# Patient Record
Sex: FEMALE | Race: WHITE | NOT HISPANIC OR LATINO | Employment: UNEMPLOYED | URBAN - METROPOLITAN AREA
[De-identification: names, ages, dates, MRNs, and addresses within clinical notes are randomized per-mention and may not be internally consistent; named-entity substitution may affect disease eponyms.]

---

## 2023-03-07 RX ORDER — BACLOFEN 10 MG/1
20 TABLET ORAL AT BEDTIME
COMMUNITY
End: 2023-03-07

## 2023-03-07 RX ORDER — ACETAMINOPHEN 500 MG
500-1000 TABLET ORAL EVERY 6 HOURS PRN
COMMUNITY

## 2023-03-07 RX ORDER — IBUPROFEN 200 MG
200-800 TABLET ORAL EVERY 6 HOURS PRN
COMMUNITY

## 2023-03-07 RX ORDER — FLUTICASONE PROPIONATE 50 MCG
2 SPRAY, SUSPENSION (ML) NASAL PRN
COMMUNITY

## 2023-03-07 RX ORDER — CYCLOBENZAPRINE HCL 10 MG
10 TABLET ORAL
COMMUNITY

## 2023-03-07 RX ORDER — PREGABALIN 300 MG/1
1 CAPSULE ORAL 2 TIMES DAILY
COMMUNITY

## 2023-03-07 RX ORDER — ONDANSETRON 4 MG/1
4 TABLET, FILM COATED ORAL 3 TIMES DAILY PRN
COMMUNITY

## 2023-03-07 RX ORDER — BACLOFEN 10 MG/1
10 TABLET ORAL EVERY MORNING
COMMUNITY
End: 2023-03-07

## 2023-03-07 NOTE — PROGRESS NOTES
PTA medications updated by Medication Scribe prior to surgery via phone call with patient (last doses completed by Nurse)     Medication history sources: Patient and H&P  In the past week, patient estimated taking medication this percent of the time: Greater than 90%  Adherence assessment: N/A Not Observed    Significant changes made to the medication list:  Patient reports no longer taking the following meds (med scribe removed from PTA med list): Baclofen      Additional medication history information:   None    Medication reconciliation completed by provider prior to medication history? No    Time spent in this activity: 40 minutes    The information provided in this note is only as accurate as the sources available at the time of update(s)      Prior to Admission medications    Medication Sig Last Dose Taking? Auth Provider Long Term End Date   acetaminophen (TYLENOL) 500 MG tablet Take 500-1,000 mg by mouth every 6 hours as needed for mild pain Unknown at PRN Yes Reported, Patient     cyclobenzaprine (FLEXERIL) 10 MG tablet Take 10 mg by mouth nightly as needed Unknown at HS Yes Reported, Patient     diclofenac (VOLTAREN) 1 % topical gel Apply 0.5 g topically as needed Past Week at PRN Yes Reported, Patient     fluticasone (FLONASE) 50 MCG/ACT nasal spray Spray 2 sprays into both nostrils as needed for rhinitis or allergies Unknown at PRN Yes Reported, Patient     ibuprofen (ADVIL/MOTRIN) 200 MG tablet Take 200-800 mg by mouth every 6 hours as needed for moderate pain (4-6) 2/27/2023 at PRN Yes Reported, Patient     Multiple Vitamin (MULTIVITAMIN ADULT PO) Take 1 tablet by mouth daily 3/7/2023 at AM Yes Reported, Patient     NONFORMULARY Take 150 mg by mouth daily Methadone Liquid  Clinic:[facility]  Phone # [phone] 3/7/2023 at AM Yes Reported, Patient     ondansetron (ZOFRAN) 4 MG tablet Take 4 mg by mouth 3 times daily as needed for nausea Unknown at PRN Yes Reported, Patient      pregabalin (LYRICA) 300 MG capsule Take 1 capsule by mouth 2 times daily  at AM Yes Reported, Patient Yes

## 2023-03-08 ENCOUNTER — ANESTHESIA EVENT (OUTPATIENT)
Dept: SURGERY | Facility: CLINIC | Age: 43
End: 2023-03-08
Payer: COMMERCIAL

## 2023-03-08 ASSESSMENT — LIFESTYLE VARIABLES: TOBACCO_USE: 1

## 2023-03-08 NOTE — ANESTHESIA PREPROCEDURE EVALUATION
Anesthesia Pre-Procedure Evaluation    Patient: Janki Schmid   MRN: [id number] : 1980        Procedure : Procedure(s):  extraction of teeth 2, 3, 14, 18, 29          Past Medical History:   Diagnosis Date     CHESTER (generalized anxiety disorder)      Major depressive disorder, recurrent episode, moderate (H)      Other chronic pain     methadone maintenance     Primary insomnia      Temporomandibular joint disorder       Past Surgical History:   Procedure Laterality Date     GYN SURGERY      COLPOSCOPY     HYSTERECTOMY        No Known Allergies   Social History     Tobacco Use     Smoking status: Every Day     Years: 15.00     Types: Cigarettes     Smokeless tobacco: Never   Substance Use Topics     Alcohol use: Never      Wt Readings from Last 1 Encounters:   No data found for Wt        Prior to Admission medications    Medication Sig Start Date End Date Taking? Authorizing Provider   acetaminophen (TYLENOL) 500 MG tablet Take 500-1,000 mg by mouth every 6 hours as needed for mild pain   Yes Reported, Patient   cyclobenzaprine (FLEXERIL) 10 MG tablet Take 10 mg by mouth nightly as needed   Yes Reported, Patient   diclofenac (VOLTAREN) 1 % topical gel Apply 0.5 g topically as needed   Yes Reported, Patient   fluticasone (FLONASE) 50 MCG/ACT nasal spray Spray 2 sprays into both nostrils as needed for rhinitis or allergies   Yes Reported, Patient   ibuprofen (ADVIL/MOTRIN) 200 MG tablet Take 200-800 mg by mouth every 6 hours as needed for moderate pain (4-6)   Yes Reported, Patient   Multiple Vitamin (MULTIVITAMIN ADULT PO) Take 1 tablet by mouth daily   Yes Reported, Patient   NONFORMULARY Take 150 mg by mouth daily Methadone Liquid  Clinic:[facility]  Phone # [phone]   Yes Reported, Patient   ondansetron (ZOFRAN) 4 MG tablet Take 4 mg by mouth 3 times daily as needed for nausea   Yes Reported, Patient   pregabalin (LYRICA) 300 MG capsule Take 1 capsule by mouth 2 times daily    Yes Reported, Patient     RECENT LABS:   ECG:   ECHO:   CXR:      Anesthesia Evaluation   Pt has had prior anesthetic. Type: General.    No history of anesthetic complications       ROS/MED HX  ENT/Pulmonary: Comment: TMJ    (+) allergic rhinitis, tobacco use, Past use,  (-) asthma and sleep apnea   Neurologic:    (-) no seizures, no CVA and migraines   Cardiovascular:    (-) hypertension, CAD, HURTADO, arrhythmias, valvular problems/murmurs and dyslipidemia   METS/Exercise Tolerance: >4 METS    Hematologic:    (-) history of blood clots and anemia   Musculoskeletal:    (-) arthritis   GI/Hepatic:    (-) GERD and liver disease   Renal/Genitourinary:    (-) renal disease and nephrolithiasis   Endo:     (+) Obesity,  (-) Type I DM, Type II DM and thyroid disease   Psychiatric/Substance Use:     (+) psychiatric history anxiety and depression H/O chronic opiod use .     Infectious Disease:    (-) Recent Fever   Malignancy:       Other:      (+) , H/O Chronic Pain,        Physical Exam    Airway        Mallampati: II   TM distance: > 3 FB   Neck ROM: full   Mouth opening: > 3 cm    Respiratory Devices and Support         Dental       (+) Completely normal teeth      Cardiovascular   cardiovascular exam normal       Rhythm and rate: normal     Pulmonary   pulmonary exam normal        breath sounds clear to auscultation           OUTSIDE LABS:  CBC: No results found for: WBC, HGB, HCT, PLT  BMP: No results found for: NA, POTASSIUM, CHLORIDE, CO2, BUN, CR, GLC  COAGS: No results found for: PTT, INR, FIBR  POC: No results found for: BGM, HCG, HCGS  HEPATIC: No results found for: ALBUMIN, PROTTOTAL, ALT, AST, GGT, ALKPHOS, BILITOTAL, BILIDIRECT, CARMELA  OTHER: No results found for: PH, LACT, A1C, HAMZAH, PHOS, MAG, LIPASE, AMYLASE, TSH, T4, T3, CRP, SED    Anesthesia Plan    ASA Status:  2   NPO Status:  NPO Appropriate    Anesthesia Type: General.     - Airway: ETT   Induction: Propofol.   Maintenance: Balanced.         Consents    Anesthesia Plan(s) and associated risks, benefits, and realistic alternatives discussed. Questions answered and patient/representative(s) expressed understanding.    - Discussed:     - Discussed with:  Patient         Postoperative Care    Pain management: Multi-modal analgesia.   PONV prophylaxis: Ondansetron (or other 5HT-3), Dexamethasone or Solumedrol     Comments:                Mi Cuevas MD

## 2023-03-09 ENCOUNTER — HOSPITAL ENCOUNTER (OUTPATIENT)
Facility: CLINIC | Age: 43
Discharge: HOME | End: 2023-03-09
Attending: DENTIST | Admitting: DENTIST
Payer: COMMERCIAL

## 2023-03-09 ENCOUNTER — ANESTHESIA (OUTPATIENT)
Dept: SURGERY | Facility: CLINIC | Age: 43
End: 2023-03-09
Payer: COMMERCIAL

## 2023-03-09 VITALS
BODY MASS INDEX: 42.75 KG/M2 | DIASTOLIC BLOOD PRESSURE: 60 MMHG | RESPIRATION RATE: 12 BRPM | SYSTOLIC BLOOD PRESSURE: 141 MMHG | WEIGHT: 266 LBS | TEMPERATURE: 97.1 F | OXYGEN SATURATION: 92 % | HEART RATE: 65 BPM | HEIGHT: 66 IN

## 2023-03-09 DIAGNOSIS — K02.9 DENTAL CARIES: Primary | ICD-10-CM

## 2023-03-09 PROCEDURE — 360N000075 HC SURGERY LEVEL 2, PER MIN: Performed by: DENTIST

## 2023-03-09 PROCEDURE — 250N000011 HC RX IP 250 OP 636: Performed by: DENTIST

## 2023-03-09 PROCEDURE — 250N000009 HC RX 250: Performed by: DENTIST

## 2023-03-09 PROCEDURE — 250N000013 HC RX MED GY IP 250 OP 250 PS 637: Performed by: DENTIST

## 2023-03-09 PROCEDURE — 258N000003 HC RX IP 258 OP 636: Performed by: ANESTHESIOLOGY

## 2023-03-09 PROCEDURE — 250N000009 HC RX 250: Performed by: ANESTHESIOLOGY

## 2023-03-09 PROCEDURE — 370N000017 HC ANESTHESIA TECHNICAL FEE, PER MIN: Performed by: DENTIST

## 2023-03-09 PROCEDURE — 250N000011 HC RX IP 250 OP 636: Performed by: ANESTHESIOLOGY

## 2023-03-09 PROCEDURE — 710N000012 HC RECOVERY PHASE 2, PER MINUTE: Performed by: DENTIST

## 2023-03-09 PROCEDURE — 250N000025 HC SEVOFLURANE, PER MIN: Performed by: DENTIST

## 2023-03-09 PROCEDURE — 999N000141 HC STATISTIC PRE-PROCEDURE NURSING ASSESSMENT: Performed by: DENTIST

## 2023-03-09 PROCEDURE — 272N000001 HC OR GENERAL SUPPLY STERILE: Performed by: DENTIST

## 2023-03-09 PROCEDURE — 710N000009 HC RECOVERY PHASE 1, LEVEL 1, PER MIN: Performed by: DENTIST

## 2023-03-09 RX ORDER — CEFAZOLIN SODIUM/WATER 2 G/20 ML
2 SYRINGE (ML) INTRAVENOUS SEE ADMIN INSTRUCTIONS
Status: DISCONTINUED | OUTPATIENT
Start: 2023-03-09 | End: 2023-03-09 | Stop reason: HOSPADM

## 2023-03-09 RX ORDER — SODIUM CHLORIDE, SODIUM LACTATE, POTASSIUM CHLORIDE, CALCIUM CHLORIDE 600; 310; 30; 20 MG/100ML; MG/100ML; MG/100ML; MG/100ML
INJECTION, SOLUTION INTRAVENOUS CONTINUOUS
Status: DISCONTINUED | OUTPATIENT
Start: 2023-03-09 | End: 2023-03-09 | Stop reason: HOSPADM

## 2023-03-09 RX ORDER — PROPOFOL 10 MG/ML
INJECTION, EMULSION INTRAVENOUS PRN
Status: DISCONTINUED | OUTPATIENT
Start: 2023-03-09 | End: 2023-03-09

## 2023-03-09 RX ORDER — HYDROMORPHONE HCL IN WATER/PF 6 MG/30 ML
0.4 PATIENT CONTROLLED ANALGESIA SYRINGE INTRAVENOUS EVERY 5 MIN PRN
Status: DISCONTINUED | OUTPATIENT
Start: 2023-03-09 | End: 2023-03-09 | Stop reason: HOSPADM

## 2023-03-09 RX ORDER — HYDROCODONE BITARTRATE AND ACETAMINOPHEN 5; 325 MG/1; MG/1
1 TABLET ORAL EVERY 6 HOURS PRN
Qty: 10 TABLET | Refills: 0 | Status: SHIPPED | OUTPATIENT
Start: 2023-03-09 | End: 2023-03-12

## 2023-03-09 RX ORDER — ONDANSETRON 2 MG/ML
4 INJECTION INTRAMUSCULAR; INTRAVENOUS EVERY 30 MIN PRN
Status: DISCONTINUED | OUTPATIENT
Start: 2023-03-09 | End: 2023-03-09 | Stop reason: HOSPADM

## 2023-03-09 RX ORDER — BUPIVACAINE HYDROCHLORIDE AND EPINEPHRINE 2.5; 5 MG/ML; UG/ML
INJECTION, SOLUTION INFILTRATION; PERINEURAL PRN
Status: DISCONTINUED | OUTPATIENT
Start: 2023-03-09 | End: 2023-03-09 | Stop reason: HOSPADM

## 2023-03-09 RX ORDER — LIDOCAINE HYDROCHLORIDE 20 MG/ML
INJECTION, SOLUTION INFILTRATION; PERINEURAL PRN
Status: DISCONTINUED | OUTPATIENT
Start: 2023-03-09 | End: 2023-03-09

## 2023-03-09 RX ORDER — AMOXICILLIN 500 MG/1
500 CAPSULE ORAL 3 TIMES DAILY
Qty: 21 CAPSULE | Refills: 0 | Status: SHIPPED | OUTPATIENT
Start: 2023-03-09 | End: 2023-03-16

## 2023-03-09 RX ORDER — ONDANSETRON 4 MG/1
4 TABLET, ORALLY DISINTEGRATING ORAL EVERY 30 MIN PRN
Status: DISCONTINUED | OUTPATIENT
Start: 2023-03-09 | End: 2023-03-09 | Stop reason: HOSPADM

## 2023-03-09 RX ORDER — HYDROMORPHONE HCL IN WATER/PF 6 MG/30 ML
0.2 PATIENT CONTROLLED ANALGESIA SYRINGE INTRAVENOUS EVERY 5 MIN PRN
Status: DISCONTINUED | OUTPATIENT
Start: 2023-03-09 | End: 2023-03-09 | Stop reason: HOSPADM

## 2023-03-09 RX ORDER — CEFAZOLIN SODIUM/WATER 2 G/20 ML
2 SYRINGE (ML) INTRAVENOUS
Status: COMPLETED | OUTPATIENT
Start: 2023-03-09 | End: 2023-03-09

## 2023-03-09 RX ORDER — SODIUM CHLORIDE, SODIUM LACTATE, POTASSIUM CHLORIDE, CALCIUM CHLORIDE 600; 310; 30; 20 MG/100ML; MG/100ML; MG/100ML; MG/100ML
INJECTION, SOLUTION INTRAVENOUS CONTINUOUS PRN
Status: DISCONTINUED | OUTPATIENT
Start: 2023-03-09 | End: 2023-03-09

## 2023-03-09 RX ORDER — FENTANYL CITRATE 0.05 MG/ML
50 INJECTION, SOLUTION INTRAMUSCULAR; INTRAVENOUS EVERY 5 MIN PRN
Status: DISCONTINUED | OUTPATIENT
Start: 2023-03-09 | End: 2023-03-09 | Stop reason: HOSPADM

## 2023-03-09 RX ORDER — FENTANYL CITRATE 0.05 MG/ML
25 INJECTION, SOLUTION INTRAMUSCULAR; INTRAVENOUS EVERY 5 MIN PRN
Status: DISCONTINUED | OUTPATIENT
Start: 2023-03-09 | End: 2023-03-09 | Stop reason: HOSPADM

## 2023-03-09 RX ORDER — DEXMEDETOMIDINE HYDROCHLORIDE 4 UG/ML
INJECTION, SOLUTION INTRAVENOUS PRN
Status: DISCONTINUED | OUTPATIENT
Start: 2023-03-09 | End: 2023-03-09

## 2023-03-09 RX ORDER — CHLORHEXIDINE GLUCONATE ORAL RINSE 1.2 MG/ML
10 SOLUTION DENTAL ONCE
Status: COMPLETED | OUTPATIENT
Start: 2023-03-09 | End: 2023-03-09

## 2023-03-09 RX ORDER — CHLORHEXIDINE GLUCONATE ORAL RINSE 1.2 MG/ML
15 SOLUTION DENTAL 2 TIMES DAILY
Qty: 473 ML | Refills: 0 | Status: SHIPPED | OUTPATIENT
Start: 2023-03-09

## 2023-03-09 RX ORDER — FENTANYL CITRATE 50 UG/ML
INJECTION, SOLUTION INTRAMUSCULAR; INTRAVENOUS PRN
Status: DISCONTINUED | OUTPATIENT
Start: 2023-03-09 | End: 2023-03-09

## 2023-03-09 RX ORDER — DEXAMETHASONE SODIUM PHOSPHATE 4 MG/ML
INJECTION, SOLUTION INTRA-ARTICULAR; INTRALESIONAL; INTRAMUSCULAR; INTRAVENOUS; SOFT TISSUE PRN
Status: DISCONTINUED | OUTPATIENT
Start: 2023-03-09 | End: 2023-03-09

## 2023-03-09 RX ORDER — ONDANSETRON 2 MG/ML
INJECTION INTRAMUSCULAR; INTRAVENOUS PRN
Status: DISCONTINUED | OUTPATIENT
Start: 2023-03-09 | End: 2023-03-09

## 2023-03-09 RX ADMIN — ONDANSETRON 4 MG: 2 INJECTION INTRAMUSCULAR; INTRAVENOUS at 10:25

## 2023-03-09 RX ADMIN — PROPOFOL 200 MG: 10 INJECTION, EMULSION INTRAVENOUS at 10:14

## 2023-03-09 RX ADMIN — SODIUM CHLORIDE, POTASSIUM CHLORIDE, SODIUM LACTATE AND CALCIUM CHLORIDE: 600; 310; 30; 20 INJECTION, SOLUTION INTRAVENOUS at 10:09

## 2023-03-09 RX ADMIN — DEXAMETHASONE SODIUM PHOSPHATE 8 MG: 4 INJECTION, SOLUTION INTRA-ARTICULAR; INTRALESIONAL; INTRAMUSCULAR; INTRAVENOUS; SOFT TISSUE at 10:20

## 2023-03-09 RX ADMIN — DEXMEDETOMIDINE HYDROCHLORIDE 10 MCG: 200 INJECTION INTRAVENOUS at 10:15

## 2023-03-09 RX ADMIN — LIDOCAINE HYDROCHLORIDE 80 MG: 20 INJECTION, SOLUTION INFILTRATION; PERINEURAL at 10:14

## 2023-03-09 RX ADMIN — DEXMEDETOMIDINE HYDROCHLORIDE 10 MCG: 200 INJECTION INTRAVENOUS at 10:17

## 2023-03-09 RX ADMIN — SUCCINYLCHOLINE CHLORIDE 120 MG: 20 INJECTION, SOLUTION INTRAMUSCULAR; INTRAVENOUS; PARENTERAL at 10:15

## 2023-03-09 RX ADMIN — CHLORHEXIDINE GLUCONATE 10 ML: 1.2 SOLUTION ORAL at 08:46

## 2023-03-09 RX ADMIN — PROPOFOL 150 MG: 10 INJECTION, EMULSION INTRAVENOUS at 10:16

## 2023-03-09 RX ADMIN — FENTANYL CITRATE 100 MCG: 50 INJECTION, SOLUTION INTRAMUSCULAR; INTRAVENOUS at 10:14

## 2023-03-09 RX ADMIN — Medication 2 G: at 10:12

## 2023-03-09 RX ADMIN — MIDAZOLAM 2 MG: 1 INJECTION INTRAMUSCULAR; INTRAVENOUS at 10:10

## 2023-03-09 ASSESSMENT — ENCOUNTER SYMPTOMS
DYSRHYTHMIAS: 0
SEIZURES: 0

## 2023-03-09 ASSESSMENT — ACTIVITIES OF DAILY LIVING (ADL)
ADLS_ACUITY_SCORE: 35
ADLS_ACUITY_SCORE: 35

## 2023-03-09 NOTE — INTERVAL H&P NOTE
"I have reviewed the surgical (or preoperative) H&P that is linked to this encounter, and examined the patient. There are no significant changes    Clinical Conditions Present on Arrival:  Clinically Significant Risk Factors Present on Admission                    # Severe Obesity: Estimated body mass index is 42.93 kg/m  as calculated from the following:    Height as of this encounter: 1.676 m (5' 6\").    Weight as of this encounter: 120.7 kg (266 lb).       "

## 2023-03-09 NOTE — ANESTHESIA POSTPROCEDURE EVALUATION
Patient: Janki Schmid    Procedure: Procedure(s):  extraction of teeth 2, 3, 14, 18, 29       Anesthesia Type:  General    Note:  Disposition: Outpatient   Postop Pain Control: Uneventful            Sign Out: Well controlled pain   PONV: No   Neuro/Psych: Uneventful            Sign Out: Acceptable/Baseline neuro status   Airway/Respiratory: Uneventful            Sign Out: Acceptable/Baseline resp. status   CV/Hemodynamics: Uneventful            Sign Out: Acceptable CV status; No obvious hypovolemia; No obvious fluid overload   Other NRE: NONE   DID A NON-ROUTINE EVENT OCCUR? No           Last vitals:  Vitals Value Taken Time   /63 03/09/23 1156   Temp 36.2  C (97.1  F) 03/09/23 1156   Pulse 68 03/09/23 1157   Resp 10 03/09/23 1157   SpO2 91 % 03/09/23 1157   Vitals shown include unvalidated device data.    Electronically Signed By: Mi Cuevas MD  March 9, 2023  4:24 PM

## 2023-03-09 NOTE — ANESTHESIA CARE TRANSFER NOTE
Patient: Janki Schmid    Procedure: Procedure(s):  extraction of teeth 2, 3, 14, 18, 29       Diagnosis: Dental caries limited to enamel [K02.9]  Disorder of right temporomandibular joint [M26.601]  Morbid obesity (H) [E66.01]  Opioid type dependence, continuous (H) [F11.20]  Diagnosis Additional Information: No value filed.    Anesthesia Type:   General     Note:    Oropharynx: spontaneously breathing  Level of Consciousness: drowsy  Oxygen Supplementation: face mask  Level of Supplemental Oxygen (L/min / FiO2): 6  Independent Airway: airway patency satisfactory and stable  Dentition: S/P dental procedure  Vital Signs Stable: post-procedure vital signs reviewed and stable  Report to RN Given: handoff report given  Patient transferred to: PACU  Comments: Neuromuscular blockade not used after succinylcholine for intubation, spontaneous return of TOF 4/4 with sustained tetany, spontaneous respirations, adequate tidal volumes, followed commands to voice, oropharynx suctioned with soft flexible catheter, extubated atraumatically, extubated with suction, airway patent after extubation.  Oxygen via facemask at 6 liters per minute to PACU. Oxygen tubing connected to wall O2 in PACU, SpO2, NiBP, and EKG monitors and alarms on and functioning, Bear Hugger warmer connected to patient gown, report on patient's clinical status given to PACU RN, RN questions answered.     Handoff Report: Identifed the Patient, Identified the Reponsible Provider, Reviewed the pertinent medical history, Discussed the surgical course, Reviewed Intra-OP anesthesia mangement and issues during anesthesia, Set expectations for post-procedure period and Allowed opportunity for questions and acknowledgement of understanding      Vitals:  Vitals Value Taken Time   BP     Temp     Pulse     Resp     SpO2         Electronically Signed By: YARELY Castano CRNA  March 9, 2023  11:02 AM

## 2023-03-09 NOTE — OR NURSING
Discharge instructions given to patient's daughter by phone. Medications was given to patient as well. No questions and concerns at this moment

## 2023-03-09 NOTE — OP NOTE
Procedure Date: 03/09/2023    PREOPERATIVE DIAGNOSES:    1.  Dental caries and fracture, teeth numbers 2, 3, 14, 18, 29.  2.  Severe dental attrition.  3.  Multiple medical comorbidities.  4.  History of opioid dependence, currently on methadone.    POSTOPERATIVE DIAGNOSES:    1.  Dental caries and fracture, teeth numbers 2, 3, 14, 18, 29.  2.  Severe dental attrition.  3.  Multiple medical comorbidities.  4.  History of opioid dependence, currently on methadone.    PROCEDURE PERFORMED:  Surgical removal of teeth numbers 2, 3, 14, 18, 29.    SURGEON:  Germán Garcia DDS    :  DANISHA Coelho    ANESTHESIA:  1.  General via oral endotracheal tube.  2.  Adjunctive local anesthesia with 2% lidocaine with 1:100,000 epinephrine as well as 0.25% Marcaine.    BRIEF HISTORY:  Janki Schmid is a 43-year-old woman with moderate to severe pain associated with multiple fractured teeth. She was seen by Dr. Brown in our office.  Given her desire for general anesthesia and in combination with her multiple medical comorbidities including morbid obesity and opioid dependence, it was recommended to have her managed at same day surgery.  She was placed on my schedule.  I met her preoperatively today.  All risks, benefits and potential complications have been reviewed in detail.  We specifically discussed how to manage postoperative pain.  The goal here, of course, is to use over-the-counter only, but I will write some Norco to be used for breakthrough pain.  Her sponsor will be responsible for allowing her to use this, should it be required.  She will also receive postoperative antibiotics and mouth rinses.  I also explained to Janki that there are a multitude of other teeth here that are in need for general restorative cares.  There are multiple teeth with active decay and fractures, and certainly other extractions could become necessary in the future.  She understands this.    DESCRIPTION OF PROCEDURE:   Janki was taken to OP suite #42.  She was seated supine and sedated.  Following adequate depth, an oral endotracheal tube was inserted on the first attempt.  End-tidal CO2 bilateral breath sounds were confirmed.  The tube was secured off to the right by the Anesthesia team.  A timeout per Eldred protocol was performed.  A throat pack was placed.  The oral cavity was suctioned.  Attention was given to the left side first.  Tooth #14 was sectioned with the Orozco drill using the 560 fissure ian and removed in 3 pieces.  The site was irrigated.  One chromic suture was used in interrupted fashion for closure.  Attention was given to tooth #18.  A 15 blade was used to make a lateral incision.  The tooth was sectioned and root remnants were retrieved.  Sites were curetted.  Bony margins were smoothed.  One interrupted figure-of-eight 3-0 chromic suture was used for closure.    Attention was then given to tooth #29.  Simple elevation and removal was attempted, but this resulted in further fracture of the tooth.  A 15 blade was used to make a lateral incision.  A full-thickness flap was reflected.  The Orozco drill with 560 fissure bur was used to create a trough.  The tooth remnant was ultimately retrieved then with a #150 forceps.  Bony margins were smoothed.  Three interrupted 3-0 chromic sutures were used for closure.  Finally, attention was given to teeth numbers 2 and 3.  These were fractured below the gumline.  A 15 blade was used to make a lateral incision.  A full-thickness lateral flap was reflected.  The tooth remnants were sectioned and retrieved in multiple pieces.  Two interrupted 3-0 chromic sutures were used for closure.  This completed the operation.  The throat pack was removed.  Surgical and sponge counts were noted to be correct x2.    ESTIMATED BLOOD LOSS:  5 mL.    REPLACEMENTS:  Per Anesthesia.    COMPLICATIONS:  None apparent.    FINDINGS:  None significant.    DRAINS:  None.    SPECIMENS:   None.    DISPOSITION:  We will discharge Janki to home as a same-day surgery patient.    Germán Garcia DDS        D: 2023   T: 2023   MT: [id number]    Name:     JANKI BROOKS  MRN:      [id number]        Account:        [id number]   :      1980           Procedure Date: 2023     Document: [id number]

## 2023-03-09 NOTE — OR NURSING
Upon entry to preop room, noted that patient was eating spoonfuls of ice chips mixed with Diet Pepsi.  Patient instructed to stop eating ice chips and oral swabs given for comfort.  Patient states ice was helping with oral pain.  MDA notified.  Surgery delayed due to NPO status.  Able to have surgery later today.  Patient informed and understanding of circumstances.

## 2023-03-09 NOTE — DISCHARGE INSTRUCTIONS
Same Day Surgery Discharge Instructions for  Sedation and General Anesthesia     It's not unusual to feel dizzy, light-headed or faint for up to 24 hours after surgery or while taking pain medication.  If you have these symptoms: sit for a few minutes before standing and have someone assist you when you get up to walk or use the bathroom.    You should rest and relax for the next 24 hours. We recommend you make arrangements to have an adult stay with you for at least 24 hours after your discharge.  Avoid hazardous and strenuous activity.    DO NOT DRIVE any vehicle or operate mechanical equipment for 24 hours following the end of your surgery.  Even though you may feel normal, your reactions may be affected by the medication you have received.    Do not drink alcoholic beverages for 24 hours following surgery.     Slowly progress to your regular diet as you feel able. It's not unusual to feel nauseated and/or vomit after receiving anesthesia.  If you develop these symptoms, drink clear liquids (apple juice, ginger ale, broth, 7-up, etc. ) until you feel better.  If your nausea and vomiting persists for 24 hours, please notify your surgeon.      All narcotic pain medications, along with inactivity and anesthesia, can cause constipation. Drinking plenty of liquids and increasing fiber intake will help.    For any questions of a medical nature, call your surgeon.    Do not make important decisions for 24 hours.    If you had general anesthesia, you may have a sore throat for a couple of days related to the breathing tube used during surgery.  You may use Cepacol lozenges to help with this discomfort.  If it worsens or if you develop a fever, contact your surgeon.     If you feel your pain is not well managed with the pain medications prescribed by your surgeon, please contact your surgeon's office to let them know so they can address your concerns.   MOUTH CARE FOLLOWING ORAL SURGERY  Oral and Maxillofacial Surgical  Consultants  Stevie Mota, Francie, Edmund, Catherine, Randi Mcnally, Yanni Garcia, Crow, Kevin      Immediately following your surgery:   - When you get home, remove the gauze.  Do not replace unless you see active bleeding (pooling/dripping).   - Begin brushing your teeth the evening of surgery.   - Avoid rigorous exercise and get adequate rest for the first two to three days.   - If antibiotics are prescribed, please begin taking these the day of surgery.  If you are nauseated, wait until the next day.   - Do not drive for 24 hours after having I.V. Anesthesia.  Do not drive while taking a prescription pain medication (not including prescription ibuprofen).   - For the first 7 days, avoid smoking, spitting, drinking with straws, or vigorous rinsing.    1.  BLEEDING:  Some oozing may continue for the first 24-48 hours and is not unexpected.  If bleeding persists, apply the gauze directly over the extraction site and bite down firmly for 30 minutes.  You may remove the gauze to eat or drink and replace if needed.  2.  SWELLING:  Apply ice packs on and off for 30 minutes for 24-48 hours after your surgery, excluding overnight, to the outside of your face over the surgery site(s).  Do not apply heat.  Swelling is to be expected following surgery and peaks 2-3 days after.  Elevating your head in the first 48 hours will minimize swelling.  3.  TOBACCO:  Do not smoke or use tobacco products for 7 days.  Smoking greatly increases your risk of infections and dry sockets and will delay healing.  4.  RINSES:  Beginning the day after surgery, dissolve 1/4 teaspoon of salt in a full glass of warm water.  Rinse four times per day (after meals and before bed) for one week.  If given a prescription mouth rinse, use that in addition to salt water rinses for one week, starting the day of surgery.  5.  DIET:  After surgery while you are still numb, eat cool, soft foods.  Once numbness wears off, eat any diet you can  "tolerate excluding peanuts, popcorn, chips and other crunchy foods that are likely to become \"stuck\" in extraction sockets.  6.  REST & FLUIDS:  After I.V. anesthesia, drink plenty of fluids.  Be sure to get 8-10 hours of sleep at night.  Do NOT use straws for one week following surgery.  7.  PAIN:  If given prescription ibuprofen, take as directed.  Otherwise we recommend 600mg (three over-the-counter ibuprofen) immediately after surgery and then every 6 hours for two to three days.  The stronger pain medication may be used in addition if necessary (make sure to take with food to avoid nausea).   8.  FEVER:  A low grade fever is likely.  If questionable, do not hesitate to call.  9.  NAUSEA:  Nausea may occur following general anesthesia.  Treat with clear liquids and advance diet as tolerated.  If vomiting is a problem, call our office.  10.  STITCHES:  If stitches were used, they are usually dissolvable.  You will be advised by our office if you have sutures that require removal at a later appointment.  11.  DRY SOCKETS:  Soreness is common for the first couple of days after surgery.  If pain increases (throbbing, waking up at night) after 3-6 days, call our office.  12.  NUMBNESS:  We often use a long-acting local anesthetic that may remain in effect the entire day.        EMERGENCY CALLS  If you have questions or concerns, please call our office between the hours of 7am to 4pm Monday through Friday.  If you have an emergency, please call our office; if during non-business hours, the answering service will contact the doctor on call.  We do not refill pain prescriptions during the evenings or weekends.    Anna South  [facility]  [phone]  [phone]        "

## 2023-03-09 NOTE — ANESTHESIA PROCEDURE NOTES
Airway       Patient location during procedure: OR       Procedure Start/Stop Times: 3/9/2023 10:16 AM  Staff -        CRNA: Chitra Duran APRN CRNA       Other Anesthesia Staff: Jessica Buckner       Performed By: SRNAIndications and Patient Condition       Indications for airway management: mariya-procedural       Induction type:intravenous       Mask difficulty assessment: 2 - vent by mask + OA or adjuvant +/- NMBA    Final Airway Details       Final airway type: endotracheal airway       Successful airway: KRYSTEN  Endotracheal Airway Details        ETT size (mm): 7.0       Cuffed: yes       Successful intubation technique: video laryngoscopy       VL Blade Size: Glidescope 3       Grade View of Cords: 1       Adjucts: stylet       Position: Right       Measured from: gums/teeth       Secured at (cm): 21       Bite block used: None    Post intubation assessment        Placement verified by: capnometry, equal breath sounds and chest rise        Number of attempts at approach: 1       Secured with: pink tape       Ease of procedure: easy       Dentition: Intact and Unchanged    Medication(s) Administered   Medication Administration Time: 3/9/2023 10:16 AM

## 2023-03-09 NOTE — BRIEF OP NOTE
[facility]    Brief Operative Note    Pre-operative diagnosis: Dental caries limited to enamel [K02.9]  Disorder of right temporomandibular joint [M26.601]  Morbid obesity (H) [E66.01]  Opioid type dependence, continuous (H) [F11.20]  Post-operative diagnosis SAME    Procedure: Procedure(s):  extraction of teeth 2, 3, 14, 18, 29  Surgeon: Surgeon(s) and Role:     * Germán Garcia, ESTEVAN - Primary  Anesthesia: General   Estimated Blood Loss: 5ml    Drains: None  Specimens: None  Findings:   None.  Complications: None.  Implants: None

## 2025-06-10 ENCOUNTER — APPOINTMENT (OUTPATIENT)
Dept: CT IMAGING | Facility: CLINIC | Age: 45
End: 2025-06-10
Attending: STUDENT IN AN ORGANIZED HEALTH CARE EDUCATION/TRAINING PROGRAM
Payer: COMMERCIAL

## 2025-06-10 ENCOUNTER — HOSPITAL ENCOUNTER (EMERGENCY)
Facility: CLINIC | Age: 45
Discharge: HOME | End: 2025-06-10
Attending: STUDENT IN AN ORGANIZED HEALTH CARE EDUCATION/TRAINING PROGRAM | Admitting: STUDENT IN AN ORGANIZED HEALTH CARE EDUCATION/TRAINING PROGRAM
Payer: COMMERCIAL

## 2025-06-10 VITALS
DIASTOLIC BLOOD PRESSURE: 73 MMHG | OXYGEN SATURATION: 98 % | RESPIRATION RATE: 16 BRPM | BODY MASS INDEX: 42.59 KG/M2 | TEMPERATURE: 99.8 F | WEIGHT: 265 LBS | HEIGHT: 66 IN | SYSTOLIC BLOOD PRESSURE: 130 MMHG | HEART RATE: 54 BPM

## 2025-06-10 DIAGNOSIS — H65.191 OTHER NON-RECURRENT ACUTE NONSUPPURATIVE OTITIS MEDIA OF RIGHT EAR: ICD-10-CM

## 2025-06-10 DIAGNOSIS — H61.031: ICD-10-CM

## 2025-06-10 DIAGNOSIS — H60.391 INFECTIVE OTITIS EXTERNA, RIGHT: ICD-10-CM

## 2025-06-10 LAB
ANION GAP SERPL CALCULATED.3IONS-SCNC: 11 MMOL/L (ref 7–15)
BASOPHILS # BLD AUTO: 0 10E3/UL (ref 0–0.2)
BASOPHILS NFR BLD AUTO: 0 %
BUN SERPL-MCNC: 9 MG/DL (ref 6–20)
CALCIUM SERPL-MCNC: 8.9 MG/DL (ref 8.8–10.4)
CHLORIDE SERPL-SCNC: 102 MMOL/L (ref 98–107)
CREAT SERPL-MCNC: 0.78 MG/DL (ref 0.51–0.95)
EGFRCR SERPLBLD CKD-EPI 2021: >90 ML/MIN/1.73M2
EOSINOPHIL # BLD AUTO: 0.2 10E3/UL (ref 0–0.7)
EOSINOPHIL NFR BLD AUTO: 2 %
ERYTHROCYTE [DISTWIDTH] IN BLOOD BY AUTOMATED COUNT: 12.9 % (ref 10–15)
GLUCOSE SERPL-MCNC: 120 MG/DL (ref 70–99)
HCO3 SERPL-SCNC: 27 MMOL/L (ref 22–29)
HCT VFR BLD AUTO: 42.3 % (ref 35–47)
HGB BLD-MCNC: 14.4 G/DL (ref 11.7–15.7)
HOLD SPECIMEN: NORMAL
HOLD SPECIMEN: NORMAL
IMM GRANULOCYTES # BLD: 0 10E3/UL
IMM GRANULOCYTES NFR BLD: 0 %
LYMPHOCYTES # BLD AUTO: 1.7 10E3/UL (ref 0.8–5.3)
LYMPHOCYTES NFR BLD AUTO: 21 %
MCH RBC QN AUTO: 29.6 PG (ref 26.5–33)
MCHC RBC AUTO-ENTMCNC: 34 G/DL (ref 31.5–36.5)
MCV RBC AUTO: 87 FL (ref 78–100)
MONOCYTES # BLD AUTO: 0.6 10E3/UL (ref 0–1.3)
MONOCYTES NFR BLD AUTO: 8 %
NEUTROPHILS # BLD AUTO: 5.3 10E3/UL (ref 1.6–8.3)
NEUTROPHILS NFR BLD AUTO: 68 %
NRBC # BLD AUTO: 0 10E3/UL
NRBC BLD AUTO-RTO: 0 /100
PLATELET # BLD AUTO: 207 10E3/UL (ref 150–450)
POTASSIUM SERPL-SCNC: 4 MMOL/L (ref 3.4–5.3)
RBC # BLD AUTO: 4.87 10E6/UL (ref 3.8–5.2)
SODIUM SERPL-SCNC: 140 MMOL/L (ref 135–145)
WBC # BLD AUTO: 7.8 10E3/UL (ref 4–11)

## 2025-06-10 PROCEDURE — 85025 COMPLETE CBC W/AUTO DIFF WBC: CPT | Performed by: STUDENT IN AN ORGANIZED HEALTH CARE EDUCATION/TRAINING PROGRAM

## 2025-06-10 PROCEDURE — 250N000011 HC RX IP 250 OP 636: Performed by: STUDENT IN AN ORGANIZED HEALTH CARE EDUCATION/TRAINING PROGRAM

## 2025-06-10 PROCEDURE — 70481 CT ORBIT/EAR/FOSSA W/DYE: CPT

## 2025-06-10 PROCEDURE — 82435 ASSAY OF BLOOD CHLORIDE: CPT | Performed by: STUDENT IN AN ORGANIZED HEALTH CARE EDUCATION/TRAINING PROGRAM

## 2025-06-10 PROCEDURE — 36415 COLL VENOUS BLD VENIPUNCTURE: CPT | Performed by: STUDENT IN AN ORGANIZED HEALTH CARE EDUCATION/TRAINING PROGRAM

## 2025-06-10 PROCEDURE — 250N000009 HC RX 250: Performed by: STUDENT IN AN ORGANIZED HEALTH CARE EDUCATION/TRAINING PROGRAM

## 2025-06-10 PROCEDURE — 250N000013 HC RX MED GY IP 250 OP 250 PS 637: Performed by: STUDENT IN AN ORGANIZED HEALTH CARE EDUCATION/TRAINING PROGRAM

## 2025-06-10 PROCEDURE — 99285 EMERGENCY DEPT VISIT HI MDM: CPT | Mod: 25

## 2025-06-10 RX ORDER — CIPROFLOXACIN AND DEXAMETHASONE 3; 1 MG/ML; MG/ML
4 SUSPENSION/ DROPS AURICULAR (OTIC) 2 TIMES DAILY
Qty: 7.5 ML | Refills: 0 | Status: SHIPPED | OUTPATIENT
Start: 2025-06-10

## 2025-06-10 RX ORDER — OFLOXACIN 3 MG/ML
10 SOLUTION AURICULAR (OTIC) DAILY
Qty: 10 ML | Refills: 0 | Status: CANCELLED | OUTPATIENT
Start: 2025-06-10

## 2025-06-10 RX ORDER — CIPROFLOXACIN 500 MG/1
500 TABLET, FILM COATED ORAL ONCE
Status: COMPLETED | OUTPATIENT
Start: 2025-06-10 | End: 2025-06-10

## 2025-06-10 RX ORDER — AMOXICILLIN 875 MG/1
875 TABLET, COATED ORAL ONCE
Status: COMPLETED | OUTPATIENT
Start: 2025-06-10 | End: 2025-06-10

## 2025-06-10 RX ORDER — IOPAMIDOL 755 MG/ML
67 INJECTION, SOLUTION INTRAVASCULAR ONCE
Status: COMPLETED | OUTPATIENT
Start: 2025-06-10 | End: 2025-06-10

## 2025-06-10 RX ORDER — CIPROFLOXACIN AND DEXAMETHASONE 3; 1 MG/ML; MG/ML
4 SUSPENSION/ DROPS AURICULAR (OTIC) ONCE
Status: COMPLETED | OUTPATIENT
Start: 2025-06-10 | End: 2025-06-10

## 2025-06-10 RX ORDER — CIPROFLOXACIN AND DEXAMETHASONE 3; 1 MG/ML; MG/ML
4 SUSPENSION/ DROPS AURICULAR (OTIC) 2 TIMES DAILY
Qty: 7.5 ML | Refills: 0 | Status: SHIPPED | OUTPATIENT
Start: 2025-06-10 | End: 2025-06-10

## 2025-06-10 RX ORDER — ACETAMINOPHEN 500 MG
1000 TABLET ORAL ONCE
Status: COMPLETED | OUTPATIENT
Start: 2025-06-10 | End: 2025-06-10

## 2025-06-10 RX ORDER — AMOXICILLIN 875 MG/1
875 TABLET, COATED ORAL 2 TIMES DAILY
Qty: 14 TABLET | Refills: 0 | Status: CANCELLED | OUTPATIENT
Start: 2025-06-10 | End: 2025-06-17

## 2025-06-10 RX ORDER — OFLOXACIN 3 MG/ML
10 SOLUTION AURICULAR (OTIC) ONCE
Status: DISCONTINUED | OUTPATIENT
Start: 2025-06-10 | End: 2025-06-10

## 2025-06-10 RX ORDER — IBUPROFEN 600 MG/1
600 TABLET, FILM COATED ORAL ONCE
Status: COMPLETED | OUTPATIENT
Start: 2025-06-10 | End: 2025-06-10

## 2025-06-10 RX ORDER — CIPROFLOXACIN 500 MG/1
500 TABLET, FILM COATED ORAL 2 TIMES DAILY
Qty: 14 TABLET | Refills: 0 | Status: SHIPPED | OUTPATIENT
Start: 2025-06-10 | End: 2025-06-10

## 2025-06-10 RX ORDER — CIPROFLOXACIN 500 MG/1
500 TABLET, FILM COATED ORAL 2 TIMES DAILY
Qty: 14 TABLET | Refills: 0 | Status: SHIPPED | OUTPATIENT
Start: 2025-06-10

## 2025-06-10 RX ADMIN — CIPROFLOXACIN HYDROCHLORIDE 500 MG: 500 TABLET, FILM COATED ORAL at 16:29

## 2025-06-10 RX ADMIN — AMOXICILLIN 875 MG: 875 TABLET, FILM COATED ORAL at 16:29

## 2025-06-10 RX ADMIN — IBUPROFEN 600 MG: 600 TABLET ORAL at 18:10

## 2025-06-10 RX ADMIN — IBUPROFEN 600 MG: 600 TABLET ORAL at 13:23

## 2025-06-10 RX ADMIN — IOPAMIDOL 67 ML: 755 INJECTION, SOLUTION INTRAVENOUS at 14:48

## 2025-06-10 RX ADMIN — CIPROFLOXACIN AND DEXAMETHASONE 4 DROP: 3; 1 SUSPENSION/ DROPS AURICULAR (OTIC) at 18:06

## 2025-06-10 RX ADMIN — SODIUM CHLORIDE 60 ML: 9 INJECTION, SOLUTION INTRAVENOUS at 14:50

## 2025-06-10 ASSESSMENT — COLUMBIA-SUICIDE SEVERITY RATING SCALE - C-SSRS
6. HAVE YOU EVER DONE ANYTHING, STARTED TO DO ANYTHING, OR PREPARED TO DO ANYTHING TO END YOUR LIFE?: NO
2. HAVE YOU ACTUALLY HAD ANY THOUGHTS OF KILLING YOURSELF IN THE PAST MONTH?: NO
1. IN THE PAST MONTH, HAVE YOU WISHED YOU WERE DEAD OR WISHED YOU COULD GO TO SLEEP AND NOT WAKE UP?: NO

## 2025-06-10 ASSESSMENT — ACTIVITIES OF DAILY LIVING (ADL)
ADLS_ACUITY_SCORE: 41

## 2025-06-10 NOTE — ED TRIAGE NOTES
Right ear pain for 4 days, now can not hear out of it. Pt. Usine tylenol and diclofenac gel on the outside of ear.      Triage Assessment (Adult)       Row Name 06/10/25 5213          Triage Assessment    Airway WDL WDL        Respiratory WDL    Respiratory WDL WDL        Skin Circulation/Temperature WDL    Skin Circulation/Temperature WDL WDL        Cardiac WDL    Cardiac WDL WDL        Peripheral/Neurovascular WDL    Peripheral Neurovascular WDL WDL        Cognitive/Neuro/Behavioral WDL    Cognitive/Neuro/Behavioral WDL WDL

## 2025-06-10 NOTE — ED PROVIDER NOTES
Emergency Department Note      History of Present Illness     Chief Complaint   Otalgia      HPI   Janki Schmid is a 45 year old female with a past medical history significant for HTN, obesity, CHESTER, MDD, and opiate dependence who presents to the emergency department for evaluation of otalgia. She reports that for the past four days, she has experienced the progressively worsening onset of otalgia to both the external and internal portions of her right ear. She has also noticed appreciable swelling to her external ear, and notes that she is having significant difficulty with hearing out of that ear. She also endorses jaw pain which she feels with talking and eating meals. However, she does not have limited range of motion with opening her jaw and this is at baseline. She has been applying 4% lidocaine gel to the outer portion of her ear as well as taking oral over the counter pain medications, with minimal relief. She does also endorse chills and subjective fever starting yesterday, although she did not measure her temperature. She denies any sores to the outer portion of her ear. She denies allergies to medications.     Independent Historian   None    Review of External Notes   I reviewed the ED note from 2/13/25, for which the patient was seen for bronchitis.    Past Medical History     Medical History and Problem List   CHESTER  MDD  Migraine  Primary insomnia  Temporomandibular joint disorder  Abscess of mandible  Opiate dependence  Ovarian cyst  Endometriosis  PRO III  Adenomyosis  Dysmenorrhea  Obesity  Hyperhidrosis  Adjustment disorder  Migraine  Nephrolithiasis  Pre eclampsia  HTN    Medications   cyclobenzaprine   ondansetron   Pregabalin  Hydroxyzine pamoate  Pregabalin  Fentanyl  Venlafaxine  albuterol    Surgical History   Dental extraction  Colposcopy  hysterectomy  [place] teeth extraction  LEEP  Appendectomy  Left ovarian cystectomy  Laparoscopic abdominal surgery diagnostic  Physical Exam  "    Patient Vitals for the past 24 hrs:   BP Temp Temp src Pulse Resp SpO2 Height Weight   06/10/25 1813 130/73 -- -- 54 16 98 % -- --   06/10/25 1302 137/89 99.8  F (37.7  C) Temporal 75 16 97 % 1.676 m (5' 6\") 120.2 kg (265 lb)     Physical Exam  General: Awake, alert, in no acute distress   HEENT: Atraumatic   EOM normal   Trachea midline  Significant tenderness and swelling in the right antihelix, swollen external auditory canal, unable to visualize TM.  No trismus  No mastoid tenderness, erythema, swelling  Decreased hearing on the right, rating approximately 20% of normal      Neck: Supple, normal ROM  CV: bradycardic, regular rhythm   No lower extremity edema  2+ radial and DP pulses  PULM:   MSK: No gross deformities  NEURO: Alert, no focal deficits  Skin: Warm, dry and intact    Diagnostics     Lab Results   Labs Ordered and Resulted from Time of ED Arrival to Time of ED Departure   BASIC METABOLIC PANEL - Abnormal       Result Value    Sodium 140      Potassium 4.0      Chloride 102      Carbon Dioxide (CO2) 27      Anion Gap 11      Urea Nitrogen 9.0      Creatinine 0.78      GFR Estimate >90      Calcium 8.9      Glucose 120 (*)    CBC WITH PLATELETS AND DIFFERENTIAL    WBC Count 7.8      RBC Count 4.87      Hemoglobin 14.4      Hematocrit 42.3      MCV 87      MCH 29.6      MCHC 34.0      RDW 12.9      Platelet Count 207      % Neutrophils 68      % Lymphocytes 21      % Monocytes 8      % Eosinophils 2      % Basophils 0      % Immature Granulocytes 0      NRBCs per 100 WBC 0      Absolute Neutrophils 5.3      Absolute Lymphocytes 1.7      Absolute Monocytes 0.6      Absolute Eosinophils 0.2      Absolute Basophils 0.0      Absolute Immature Granulocytes 0.0      Absolute NRBCs 0.0         Imaging   CT Temporal Bones w Contrast   Final Result   IMPRESSION:   1.  Findings highly suggestive of otitis externa and otitis media with associated inflammatory/infectious findings in the mastoid air cells on the " right without destructive changes in the mastoid air cells.             EKG   None    Independent Interpretation   none    ED Course      Medications Administered   Medications   ibuprofen (ADVIL/MOTRIN) tablet 600 mg (600 mg Oral $Given 6/10/25 1323)   acetaminophen (TYLENOL) tablet 1,000 mg (1,000 mg Oral Not Given 6/10/25 1355)   iopamidol (ISOVUE-370) solution 67 mL (67 mLs Intravenous $Given 6/10/25 1448)   ciprofloxacin (CIPRO) tablet 500 mg (500 mg Oral $Given 6/10/25 1629)   amoxicillin (AMOXIL) tablet 875 mg (875 mg Oral $Given 6/10/25 1629)   ciprofloxacin-dexAMETHasone (CIPRODEX) 0.3-0.1 % otic suspension 4 drop (4 drops Right Ear $Given 6/10/25 1806)   ibuprofen (ADVIL/MOTRIN) tablet 600 mg (600 mg Oral $Given 6/10/25 1810)       Procedures   Procedures     Discussion of Management   ENT    ED Course   ED Course as of 06/10/25 2038   Renetta Wooten 10, 2025   1329 I obtained history and examined the patient as noted above.     1602 I rechecked the patient and updated. Patient is feeling a lot better at bedside.     1728 I spoke with ENT regarding patient care and treatment plan.   1739 I rechecked and updated patient on treatment plan. Discussed plan for discharge home and patient is agreeable to plan.       Additional Documentation  None    Medical Decision Making / Diagnosis     CMS Diagnoses: None    MIPS   None               [facility]   Janki Schmid is a 45 year old female here with significant right ear pain, subjective fevers.  Vital signs are reassuring, there is no leukocytosis.  She has no trismus, tolerating airway, has no vertiginous symptoms or neurologic deficits other than decreased hearing which I suspect is from a significant ear infection.  Her ear is swollen, tender, I cannot visualize her TM.  There is actually no tenderness over the mastoid process.    CT scan shows otitis media, otitis externa with chondritis.  There are some inflammatory changes of the mastoid air cells without bony changes.   I am concerned for potential tympanic membrane perforation due to degree of pain and hearing loss and I suspect this is why there are inflammatory changes in the mastoid.  Without tenderness or swelling over the mastoid process I have a low suspicion for mastoiditis at this time.  I spoke with the ENT who agree with oral and topical antibiotics.  Prescription sent to preferred pharmacy.  ENT follow-up next week, patient provided number.  Should return to ED for any worsening, can use Tylenol and ibuprofen for pain at home.    Disposition   The patient was discharged.     Diagnosis     ICD-10-CM    1. Infective otitis externa, right  H60.391       2. Other non-recurrent acute nonsuppurative otitis media of right ear  H65.191       3. Chondritis of external ear, right  H61.031                    Scribe Disclosure:  I, Melly Martinez, am serving as a scribe at 1:48 PM on 6/10/2025 to document services personally performed by Dayan Workman DO based on my observations and the provider's statements to me.        Dayan Workman DO  06/10/25 2038

## (undated) DEVICE — BUR OVAL LINVATEC 7X70MM CARBIDE 5092-236

## (undated) DEVICE — BUR DENTAL 1.75X75MM STRAIGHT 560

## (undated) DEVICE — LINEN TOWEL PACK X5 5464

## (undated) DEVICE — PACK HEAD NECK SEN15HNFSF

## (undated) DEVICE — SOL NACL 0.9% IRRIG 1000ML BOTTLE 2F7124

## (undated) DEVICE — SPONGE PACK VAGINAL 2"X9

## (undated) DEVICE — SU CHROMIC 3-0 SH 27" G122H

## (undated) DEVICE — DRSG GAUZE 4X4" 3033

## (undated) DEVICE — SU UMBILICAL TAPE .125X30" U11T

## (undated) RX ORDER — PROPOFOL 10 MG/ML
INJECTION, EMULSION INTRAVENOUS
Status: DISPENSED
Start: 2023-03-09

## (undated) RX ORDER — CHLORHEXIDINE GLUCONATE ORAL RINSE 1.2 MG/ML
SOLUTION DENTAL
Status: DISPENSED
Start: 2023-03-09

## (undated) RX ORDER — FENTANYL CITRATE 50 UG/ML
INJECTION, SOLUTION INTRAMUSCULAR; INTRAVENOUS
Status: DISPENSED
Start: 2023-03-09